# Patient Record
Sex: FEMALE | Race: AMERICAN INDIAN OR ALASKA NATIVE | Employment: FULL TIME | URBAN - METROPOLITAN AREA
[De-identification: names, ages, dates, MRNs, and addresses within clinical notes are randomized per-mention and may not be internally consistent; named-entity substitution may affect disease eponyms.]

---
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
## 2020-10-26 NOTE — TELEPHONE ENCOUNTER
DIAGNOSIS: Scoliosis / SELF    APPOINTMENT DATE: 12.1.20   NOTES STATUS DETAILS   OFFICE NOTE from referring provider N/A    OFFICE NOTE from other specialist N/A    DISCHARGE SUMMARY from hospital N/A    DISCHARGE REPORT from the ER N/A    OPERATIVE REPORT N/A    MEDICATION LIST Internal    EMG (for Spine) N/A    IMPLANT RECORD/STICKER N/A    LABS     CBC/DIFF Internal 2.1.18   CULTURES N/A    INJECTIONS DONE IN RADIOLOGY N/A    MRI N/A    CT SCAN N/A    XRAYS (IMAGES & REPORTS) N/A    TUMOR     PATHOLOGY  Slides & report N/A

## 2020-11-30 DIAGNOSIS — M41.9 SCOLIOSIS: Primary | ICD-10-CM

## 2020-12-01 ENCOUNTER — OFFICE VISIT (OUTPATIENT)
Dept: ORTHOPEDICS | Facility: CLINIC | Age: 20
End: 2020-12-01
Payer: COMMERCIAL

## 2020-12-01 ENCOUNTER — PRE VISIT (OUTPATIENT)
Dept: ORTHOPEDICS | Facility: CLINIC | Age: 20
End: 2020-12-01

## 2020-12-01 ENCOUNTER — ANCILLARY PROCEDURE (OUTPATIENT)
Dept: GENERAL RADIOLOGY | Facility: CLINIC | Age: 20
End: 2020-12-01
Attending: ORTHOPAEDIC SURGERY
Payer: COMMERCIAL

## 2020-12-01 VITALS — BODY MASS INDEX: 26.66 KG/M2 | HEIGHT: 65 IN | WEIGHT: 160 LBS

## 2020-12-01 DIAGNOSIS — M41.125 ADOLESCENT IDIOPATHIC SCOLIOSIS OF THORACOLUMBAR REGION: Primary | ICD-10-CM

## 2020-12-01 PROCEDURE — 72082 X-RAY EXAM ENTIRE SPI 2/3 VW: CPT | Performed by: STUDENT IN AN ORGANIZED HEALTH CARE EDUCATION/TRAINING PROGRAM

## 2020-12-01 PROCEDURE — 99204 OFFICE O/P NEW MOD 45 MIN: CPT | Performed by: ORTHOPAEDIC SURGERY

## 2020-12-01 ASSESSMENT — MIFFLIN-ST. JEOR: SCORE: 1497.25

## 2020-12-01 ASSESSMENT — PATIENT HEALTH QUESTIONNAIRE - PHQ9: SUM OF ALL RESPONSES TO PHQ QUESTIONS 1-9: 12

## 2020-12-01 NOTE — PROGRESS NOTES
Spine Surgery Consultation    REFERRING PHYSICIAN: Referred Self   PRIMARY CARE PHYSICIAN: Leonard Quach           Chief Complaint:   Consult (discuss scoliosis )      History of Present Illness:  Symptom Profile Including: location of symptoms, onset, severity, exacerbating/alleviating factors, previous treatments:        Korin Darby is a 20 year old female who presents today for evaluation of upper back and right arm pain that has been present for the past few months.  Patient says that she fell face first onto concrete back in March 2020 and got a concussion and had neck pain at that time which resolved.  However, in September/October she started having severe pain in right upper thoracic spine, neck, right arm.  Pain radiates down right arm in a C8 distribution and associated with numbness and tingling in the same area.  No weakness or loss of  strength in right arm.  Minimal symptoms in left arm.  No gait or balance disturbances.  Patient says that pain in right upper back/arm improved for short period, but has been worsening over the past couple weeks.  She was told by her chiropractor that she has scoliosis, but has never gotten treatment for this in the past.  She denies any low back pain or lower extremity radicular symptoms.  No other complaints or concerns today.    Past treatments tried:  - Physical therapy: none  - Injections: none  - Medications: ibuprofen    PMH:  No significant PMH  No known significant family Hx  Surgical: tonsillectomy    Social:  Student at [employer], senior, studying speech/ language therapy  Denies tobacco product use  Denies illicit drug use         Past Medical History:     Past Medical History:   Diagnosis Date     Adjustment disorder with mixed anxiety and depressed mood      Dysmenorrhea      Panic attacks             Past Surgical History:   No past surgical history on file.         Social History:     Social History     Tobacco Use     Smoking status: Never  "Smoker     Smokeless tobacco: Never Used   Substance Use Topics     Alcohol use: No            Family History:     Family History   Problem Relation Age of Onset     Anxiety Disorder Mother      Bipolar Disorder Father      Ulcerative Colitis No family hx of      Crohn's Disease No family hx of      Celiac Disease No family hx of             Allergies:     Allergies   Allergen Reactions     Amoxicillin Hives            Medications:     Current Outpatient Medications   Medication     diltiazem 2% in PLO cream, FV COMPOUNDED, 2% GEL     clindamycin-benzoyl peroxide (BENZACLIN) gel     escitalopram (LEXAPRO) 10 MG tablet     norethindrone-ethinyl estradiol (BLISOVI FE 1/20) 1-20 MG-MCG per tablet     No current facility-administered medications for this visit.              Review of Systems:     A 10 point ROS was performed and reviewed. Specific responses to these questions are noted at the end of the document.         Physical Exam:     PHYSICAL EXAM:Constitutional - Patient is healthy, well-nourished and appears stated age.    Vitals: Ht 1.66 m (5' 5.35\")   Wt 72.6 kg (160 lb)   BMI 26.34 kg/m     Respiratory - Patient is breathing normally and in no respiratory distress.   Skin - No suspicious rashes or lesions.   Psychiatric - Normal mood and affect.   Cardiovascular - Extremities warm and well perfused.   Eyes - Visual acuity is normal to the written word.   ENT - Hearing intact to the spoken word.   GI - No abdominal distention.   Musculoskeletal - Non-antalgic gait without use of assistive devices. Right ribs elevated with forward flexion. Scoliosis curvature of spine        Cervical spine:    Appearance - Normal     Palpation - Non-tender to palpation midline and paraspinals    ROM - Full , pain with right rotation    Motor -     UPPER EXTREMITY Left Right    strength 5/5 5/5   Finger ab/adduction 5/5 5/5   Wrist flexion 5/5 5/5   Wrist extension 5/5 5/5   Elbow flexion 5/5 5/5   Elbow extension 5/5 5/5 "   Shoulder flexion 5/5 5/5         Special Tests -        Lhermitte's Test - negative          Thoracic Spine:    Appearance - scoliosis    Palpation - mildly tender to palpation midline around T6 and paraspinal muscles    Strength/ROM - deferred            Lumbar Spine:    Appearance - Normal     Palpation - Non-tender to palpation midline and paraspinals    ROM - Full , painless    Motor -        LOWER EXTREMITY Left Right   Hip flexion 5/5 5/5   Knee flexion 5/5 5/5   Knee extension 5/5 5/5   Ankle dorsiflexion 5/5 5/5   Ankle plantarflexion 5/5 5/5   Great toe extension 5/5 5/5        Special tests -     Straight leg raise -Negative       Neurologic - Sensation intact to light touch bilaterally LE. Altered sensation to light touch right C8 distribution      REFLEXES Left Right   Biceps 2+ 2+   Triceps 2+ 2+   Brachioradialis 2+ 2+   Patella 2+ 2+   Ankle jerk 2+ 2+   clonus  hoffmans 1 beat  negative 1 beat  negative         Alignment:  Patient stands with a neutral standing sagittal balance.         Imaging:   We ordered and independently reviewed new radiographs at this clinic visit. The results were discussed with the patient.  Findings include:    12/1/2020 XR eos full spine standing AP/lateral views: Mild scoliosis measuring about 22 degrees right thoracic curve and 26 degrees left lumbar curve.  Grade 1 spondylolisthesis at L5-S1 with pars defect.  Loss of lordosis cervical spine.  No significant degenerative changes, fracture, or instability in cervical spine.  See full radiographic report in chart             Assessment and Plan:   Assessment:  20 year old female with right upper thoracic/shoulder pain with associated right arm radicular pain in C8 distribution.  Mild adolescent idiopathic scoliosis.  L5-S1 spondylolisthesis likely due to pars defect.     Plan:  Showed patient x-ray images today to explain her problem.  She does have mild scoliosis measuring about 20 degrees and thoracic and lumbar  spine.  This is a low degree of curvature and is unlikely to require surgical intervention for scoliosis in the future.  Explained that this is a genetic problem, so she should tell future pediatrician about her scoliosis if she is to have kids in the future.  There is also L5-S1 spondylolisthesis which is likely from a pars fracture.  Explained that many people with this, including herself, can be asymptomatic; possible to become a problem as she becomes older.  In regards to her right shoulder/arm symptoms, it is possible that this is coming from a herniated disc in the neck.  To evaluate this, we would like to get an MRI cervical spine.  In the meantime, patient should start with physical therapy.  Also offered gabapentin to treat the nerve pain symptoms in her arm, but patient would like to wait before starting this medication.  We would like patient to follow-up with a video/telephone visit after her MRI to discuss next steps in treatment.      Staff statement:  I met with the patient, performed independent history and physical exam, and overall agree with the assessment above.  Her imaging shows a low-grade scoliosis and she is skeletally mature and I think this is unlikely to progress.  She also has a pars defect at L5, but no low back pain so we are not going to work this up any further.  Lastly she is dealing with some neck and arm symptoms which seem consistent with a cervical disc herniation and I have recommended a cervical MRI.  We will follow up with her once that study is available.    Attending MD (Dr. Zaid Marinelli) :  I reviewed and verified the history and physical exam of the patient and discussed the patient's management with the other clinical providers involved in this patient's care including any involved residents or physicians assistants. I reviewed the above note and agree with the documented findings and plan of care, which were communicated to the patient.      Zaid SAN  MD Merary Marinelli PA-C    Respectfully,  Zaid Marinelli MD  Spine Surgery  [facility]

## 2020-12-01 NOTE — NURSING NOTE
"[facility]:  PHQ-9 Screening Note    SITUATION/BACKGROUND                                                    Korin Darby is a 20 year old female who completed the PHQ-9 assessment for depression and Score is >9.    Onset of symptoms: sudden and waxing and waning  Trigger: Back pain  Recent related events: Election  Prior history of suicide attempt or self harm: No   Risk Factors: anxiety  History of depression or mental illness: No   Medications reviewed: Yes     ASSESSMENT      A. Are any of the following present?      Suicidal thoughts with a plan and means to carry out the plan?    Intent to harm others    Altered mental status: confusion, delusional, psychotic YES  - Patient should be seen in the ED.  If patient is willing to go to the ED, call [employer] [facility] at [phone].  If patient is unwilling to go to the ED, call 911.   Clinic staff to fill out the  Transportation Hold  form.    Place order for referral to behavioral health team for  regular  follow-up.    NO - go to B   B. Are any of the following present?      Suicidal thoughts without a plan or means to carry out the plan    New onset of delusional ideas    Past inpatient admission for depression    New onset and recent change or addition of new medication YES  - Patient should receive crises care within 2-4 hours. Offer emergency room care or connect with any of the *crisis resources.     Place referral to behavioral health team for \"regular\" follow-up.    NO - go to C   C. Are any of the following present?      Previous suicide attempts    Depression interfering with ability to work or function    Loss of appetite and eating poorly    Abrupt cessation of drugs (OTC or RX), alcohol or caffeine    Drug or alcohol abuse YES -  [facility] behavior health team. If no response, patient should receive crisis care within 24 hours.     Place referral to behavioral health team for \"regular\" follow-up.     NO - " "go to D   D. Are several of the following present?      Difficulty concentrating    Difficulty sleeping    Reduced interest in sexual activity or impotency    Irregular or absent menstruation    No interest in activity    Change in interpersonal relationships    Increased use/abuse of alcohol or drugs    Pregnant or recent child birth    Recent major life change    History of depression YES -  Follow-up with PCP for appointment and follow home care instructions.    Place referral to behavioral health team for \"regular\" follow-up.    NO - provide home care instructions.        PLAN      Home Care Instructions:   If currently in counseling, call counselor for appointment  Call local crisis interventions  Contact friends or family for support    Report the following to your PCP:   Suicidal thoughts without a plan or means to carry out the plan  Depression interferes with daily activities  Persistent inability to sleep    Seek emergency care immediately if any of the following occur:   Suicidal thoughts and plan and means to carry out the plan  Injury to self or others  Altered mental status:  Confusion, Delusional, Pyschotic    BEHAVIORAL HEALTH TEAMS      [facility] - Behavioral Health Team    [facility] Pager: [phone]    [place]  - Behavioral Health Team    Pager number: [phone]    Referral to Behavioral Health    BEHAVIORAL / SPIRITUAL HEALTH [facility] [[id number]}    RESOURCES      - 24/7 Crisis Hotlines: National Suicide Prevention Hotline  343-764-GDPG (9119)  - Non Emergent Transportation:  [place] ([facility]): [phone]  - [facility] Pagers:  [place]: pager #: [phone]    Marlene Matamoros RN        Copyright 2016 [employer]      "

## 2020-12-01 NOTE — NURSING NOTE
"Reason For Visit:   Chief Complaint   Patient presents with     Consult     discuss scoliosis        Primary MD: Leonard Quach      Type of injury: Scoliosis.  Type of surgery: n/a.  Smoker: No  Request smoking cessation information: No    Ht 1.66 m (5' 5.35\")   Wt 72.6 kg (160 lb)   BMI 26.34 kg/m      Pain Assessment  Patient Currently in Pain: Yes  0-10 Pain Scale: 4  Primary Pain Location: (around right scapular area)                        Consuelo Thompson ATC    "

## 2020-12-01 NOTE — LETTER
12/1/2020         RE: Korin Darby  [address]  [facility] [address]        Dear Colleague,    Thank you for referring your patient, Korin Darby, to the [facility] [place]. Please see a copy of my visit note below.    Spine Surgery Consultation    REFERRING PHYSICIAN: Referred Self   PRIMARY CARE PHYSICIAN: Leonard Quach           Chief Complaint:   Consult (discuss scoliosis )      History of Present Illness:  Symptom Profile Including: location of symptoms, onset, severity, exacerbating/alleviating factors, previous treatments:        Korin Darby is a 20 year old female who presents today for evaluation of upper back and right arm pain that has been present for the past few months.  Patient says that she fell face first onto concrete back in March 2020 and got a concussion and had neck pain at that time which resolved.  However, in September/October she started having severe pain in right upper thoracic spine, neck, right arm.  Pain radiates down right arm in a C8 distribution and associated with numbness and tingling in the same area.  No weakness or loss of  strength in right arm.  Minimal symptoms in left arm.  No gait or balance disturbances.  Patient says that pain in right upper back/arm improved for short period, but has been worsening over the past couple weeks.  She was told by her chiropractor that she has scoliosis, but has never gotten treatment for this in the past.  She denies any low back pain or lower extremity radicular symptoms.  No other complaints or concerns today.    Past treatments tried:  - Physical therapy: none  - Injections: none  - Medications: ibuprofen    PMH:  No significant PMH  No known significant family Hx  Surgical: tonsillectomy    Social:  Student at [employer], senior, studying speech/ language therapy  Denies tobacco product use  Denies illicit drug use         Past Medical History:     Past Medical  "History:   Diagnosis Date     Adjustment disorder with mixed anxiety and depressed mood      Dysmenorrhea      Panic attacks             Past Surgical History:   No past surgical history on file.         Social History:     Social History     Tobacco Use     Smoking status: Never Smoker     Smokeless tobacco: Never Used   Substance Use Topics     Alcohol use: No            Family History:     Family History   Problem Relation Age of Onset     Anxiety Disorder Mother      Bipolar Disorder Father      Ulcerative Colitis No family hx of      Crohn's Disease No family hx of      Celiac Disease No family hx of             Allergies:     Allergies   Allergen Reactions     Amoxicillin Hives            Medications:     Current Outpatient Medications   Medication     diltiazem 2% in PLO cream, FV COMPOUNDED, 2% GEL     clindamycin-benzoyl peroxide (BENZACLIN) gel     escitalopram (LEXAPRO) 10 MG tablet     norethindrone-ethinyl estradiol (BLISOVI FE 1/20) 1-20 MG-MCG per tablet     No current facility-administered medications for this visit.              Review of Systems:     A 10 point ROS was performed and reviewed. Specific responses to these questions are noted at the end of the document.         Physical Exam:     PHYSICAL EXAM:Constitutional - Patient is healthy, well-nourished and appears stated age.    Vitals: Ht 1.66 m (5' 5.35\")   Wt 72.6 kg (160 lb)   BMI 26.34 kg/m     Respiratory - Patient is breathing normally and in no respiratory distress.   Skin - No suspicious rashes or lesions.   Psychiatric - Normal mood and affect.   Cardiovascular - Extremities warm and well perfused.   Eyes - Visual acuity is normal to the written word.   ENT - Hearing intact to the spoken word.   GI - No abdominal distention.   Musculoskeletal - Non-antalgic gait without use of assistive devices. Right ribs elevated with forward flexion. Scoliosis curvature of spine        Cervical spine:    Appearance - Normal     Palpation - " Non-tender to palpation midline and paraspinals    ROM - Full , pain with right rotation    Motor -     UPPER EXTREMITY Left Right    strength 5/5 5/5   Finger ab/adduction 5/5 5/5   Wrist flexion 5/5 5/5   Wrist extension 5/5 5/5   Elbow flexion 5/5 5/5   Elbow extension 5/5 5/5   Shoulder flexion 5/5 5/5         Special Tests -        Lhermitte's Test - negative          Thoracic Spine:    Appearance - scoliosis    Palpation - mildly tender to palpation midline around T6 and paraspinal muscles    Strength/ROM - deferred            Lumbar Spine:    Appearance - Normal     Palpation - Non-tender to palpation midline and paraspinals    ROM - Full , painless    Motor -        LOWER EXTREMITY Left Right   Hip flexion 5/5 5/5   Knee flexion 5/5 5/5   Knee extension 5/5 5/5   Ankle dorsiflexion 5/5 5/5   Ankle plantarflexion 5/5 5/5   Great toe extension 5/5 5/5        Special tests -     Straight leg raise -Negative       Neurologic - Sensation intact to light touch bilaterally LE. Altered sensation to light touch right C8 distribution      REFLEXES Left Right   Biceps 2+ 2+   Triceps 2+ 2+   Brachioradialis 2+ 2+   Patella 2+ 2+   Ankle jerk 2+ 2+   clonus  hoffmans 1 beat  negative 1 beat  negative         Alignment:  Patient stands with a neutral standing sagittal balance.         Imaging:   We ordered and independently reviewed new radiographs at this clinic visit. The results were discussed with the patient.  Findings include:    12/1/2020 XR eos full spine standing AP/lateral views: Mild scoliosis measuring about 22 degrees right thoracic curve and 26 degrees left lumbar curve.  Grade 1 spondylolisthesis at L5-S1 with pars defect.  Loss of lordosis cervical spine.  No significant degenerative changes, fracture, or instability in cervical spine.  See full radiographic report in chart             Assessment and Plan:   Assessment:  20 year old female with right upper thoracic/shoulder pain with associated right  arm radicular pain in C8 distribution.  Mild adolescent idiopathic scoliosis.  L5-S1 spondylolisthesis likely due to pars defect.     Plan:  Showed patient x-ray images today to explain her problem.  She does have mild scoliosis measuring about 20 degrees and thoracic and lumbar spine.  This is a low degree of curvature and is unlikely to require surgical intervention for scoliosis in the future.  Explained that this is a genetic problem, so she should tell future pediatrician about her scoliosis if she is to have kids in the future.  There is also L5-S1 spondylolisthesis which is likely from a pars fracture.  Explained that many people with this, including herself, can be asymptomatic; possible to become a problem as she becomes older.  In regards to her right shoulder/arm symptoms, it is possible that this is coming from a herniated disc in the neck.  To evaluate this, we would like to get an MRI cervical spine.  In the meantime, patient should start with physical therapy.  Also offered gabapentin to treat the nerve pain symptoms in her arm, but patient would like to wait before starting this medication.  We would like patient to follow-up with a video/telephone visit after her MRI to discuss next steps in treatment.      Staff statement:  I met with the patient, performed independent history and physical exam, and overall agree with the assessment above.  Her imaging shows a low-grade scoliosis and she is skeletally mature and I think this is unlikely to progress.  She also has a pars defect at L5, but no low back pain so we are not going to work this up any further.  Lastly she is dealing with some neck and arm symptoms which seem consistent with a cervical disc herniation and I have recommended a cervical MRI.  We will follow up with her once that study is available.    Attending MD (Dr. Zaid Marinelli) :  I reviewed and verified the history and physical exam of the patient and discussed the patient's  management with the other clinical providers involved in this patient's care including any involved residents or physicians assistants. I reviewed the above note and agree with the documented findings and plan of care, which were communicated to the patient.      MD Merary Hernandez PA-C    Respectfully,  Zaid Marinelli MD  Spine Surgery  [facility]

## 2020-12-10 ENCOUNTER — ANCILLARY PROCEDURE (OUTPATIENT)
Dept: MRI IMAGING | Facility: CLINIC | Age: 20
End: 2020-12-10
Attending: ORTHOPAEDIC SURGERY
Payer: COMMERCIAL

## 2020-12-10 DIAGNOSIS — M41.125 ADOLESCENT IDIOPATHIC SCOLIOSIS OF THORACOLUMBAR REGION: ICD-10-CM

## 2020-12-10 PROCEDURE — 72141 MRI NECK SPINE W/O DYE: CPT | Mod: GC | Performed by: RADIOLOGY

## 2020-12-15 ENCOUNTER — VIRTUAL VISIT (OUTPATIENT)
Dept: ORTHOPEDICS | Facility: CLINIC | Age: 20
End: 2020-12-15
Payer: COMMERCIAL

## 2020-12-15 DIAGNOSIS — M41.125 ADOLESCENT IDIOPATHIC SCOLIOSIS OF THORACOLUMBAR REGION: Primary | ICD-10-CM

## 2020-12-15 DIAGNOSIS — M25.511 RIGHT SHOULDER PAIN: ICD-10-CM

## 2020-12-15 PROCEDURE — 99213 OFFICE O/P EST LOW 20 MIN: CPT | Mod: 95 | Performed by: ORTHOPAEDIC SURGERY

## 2020-12-15 NOTE — NURSING NOTE
Reason For Visit:   Chief Complaint   Patient presents with     RECHECK     follow up on cervical MRI        There were no vitals taken for this visit.         Marquise Vital ATC

## 2020-12-15 NOTE — PROGRESS NOTES
"Korin Darby is a 20 year old female who is being evaluated via a billable video visit.      The patient has been notified of following:     \"This video visit will be conducted via a call between you and your physician/provider. We have found that certain health care needs can be provided without the need for an in-person physical exam.  This service lets us provide the care you need with a video conversation.  If a prescription is necessary we can send it directly to your pharmacy.  If lab work is needed we can place an order for that and you can then stop by our lab to have the test done at a later time.    Video visits are billed at different rates depending on your insurance coverage.  Please reach out to your insurance provider with any questions.    If during the course of the call the physician/provider feels a video visit is not appropriate, you will not be charged for this service.\"    Patient has given verbal consent for Video visit? Yes  How would you like to obtain your AVS? MyChart  If you are dropped from the video visit, the video invite should be resent to: Text to cell phone: [phone]  Will anyone else be joining your video visit? No      I spoke with Korin again today.  She has been dealing with pain in the right trapezium shoulder and periscapular area that comes down the back of the right arm and into her triceps region.  Sometimes there is tingling down in the hand.  We sent her for a cervical MRI to investigate this.    The good news is that we have the cervical MRI today and there is no evidence of any cord or nerve root compression.  The MRI is basically normal.    It may be that with her scoliosis and upper thoracic imbalance that this is causing some periscapular pain.  This can create muscle imbalance, and I think this would be best addressed with PT at this time.      I explained that this means there is no indication for spine surgery or injections at this time.  I have " given her a referral for right periscapular and rotator cuff strengthening.  She may be dealing with some periscapular dysfunction or trapezial dysfunction I am hopeful therapy can help her work on this.  We also discussed muscle relaxants or gabapentin, but given that she is working and in school I am concerned that these would make her sedated and interfere with her normal activities so we agreed not to do any prescription medications at this time.    Given that the spine MRI of her neck is normal I do not think there is any indication for surgery or follow-up with a surgical office at this time.  If she does not improve I asked her to contact us and we would make a referral to one of our nonoperative physicians at that time.  Video-Visit Details    Type of service:  Video Visit    Video Time 13 minutes    Originating Location (pt. Location): Home    Distant Location (provider location):  [facility]     Platform used for Video Visit: Luis Marinelli MD

## 2020-12-15 NOTE — LETTER
"    12/15/2020         RE: Korin Darby  [address]  [facility] [address]        Dear Colleague,    Thank you for referring your patient, Korin Darby, to the [facility] [place]. Please see a copy of my visit note below.    Korin Darby is a 20 year old female who is being evaluated via a billable video visit.      The patient has been notified of following:     \"This video visit will be conducted via a call between you and your physician/provider. We have found that certain health care needs can be provided without the need for an in-person physical exam.  This service lets us provide the care you need with a video conversation.  If a prescription is necessary we can send it directly to your pharmacy.  If lab work is needed we can place an order for that and you can then stop by our lab to have the test done at a later time.    Video visits are billed at different rates depending on your insurance coverage.  Please reach out to your insurance provider with any questions.    If during the course of the call the physician/provider feels a video visit is not appropriate, you will not be charged for this service.\"    Patient has given verbal consent for Video visit? Yes  How would you like to obtain your AVS? MyChart  If you are dropped from the video visit, the video invite should be resent to: Text to cell phone: [phone]  Will anyone else be joining your video visit? No      I spoke with Korin again today.  She has been dealing with pain in the right trapezium shoulder and periscapular area that comes down the back of the right arm and into her triceps region.  Sometimes there is tingling down in the hand.  We sent her for a cervical MRI to investigate this.    The good news is that we have the cervical MRI today and there is no evidence of any cord or nerve root compression.  The MRI is basically normal.    It may be that with her scoliosis and " upper thoracic imbalance that this is causing some periscapular pain.  This can create muscle imbalance, and I think this would be best addressed with PT at this time.      I explained that this means there is no indication for spine surgery or injections at this time.  I have given her a referral for right periscapular and rotator cuff strengthening.  She may be dealing with some periscapular dysfunction or trapezial dysfunction I am hopeful therapy can help her work on this.  We also discussed muscle relaxants or gabapentin, but given that she is working and in school I am concerned that these would make her sedated and interfere with her normal activities so we agreed not to do any prescription medications at this time.    Given that the spine MRI of her neck is normal I do not think there is any indication for surgery or follow-up with a surgical office at this time.  If she does not improve I asked her to contact us and we would make a referral to one of our nonoperative physicians at that time.  Video-Visit Details    Type of service:  Video Visit    Video Time 13 minutes    Originating Location (pt. Location): Home    Distant Location (provider location):  [facility]     Platform used for Video Visit: Luis Marinelli MD

## 2020-12-17 ENCOUNTER — THERAPY VISIT (OUTPATIENT)
Dept: PHYSICAL THERAPY | Facility: CLINIC | Age: 20
End: 2020-12-17
Attending: ORTHOPAEDIC SURGERY
Payer: COMMERCIAL

## 2020-12-17 DIAGNOSIS — M25.511 RIGHT SHOULDER PAIN: ICD-10-CM

## 2020-12-17 DIAGNOSIS — M41.125 ADOLESCENT IDIOPATHIC SCOLIOSIS OF THORACOLUMBAR REGION: ICD-10-CM

## 2020-12-17 PROCEDURE — 97110 THERAPEUTIC EXERCISES: CPT | Mod: GP | Performed by: PHYSICAL THERAPIST

## 2020-12-17 PROCEDURE — 97161 PT EVAL LOW COMPLEX 20 MIN: CPT | Mod: GP | Performed by: PHYSICAL THERAPIST

## 2020-12-17 NOTE — PROGRESS NOTES
Physical Therapy Initial Evaluation  December 17, 2020     MD Instructions/Precautions/Restrictions: PT eval and treat. Periscap and RC strengthening    Subjective:   C/C: R posterior shoulder/upper scapular pain, posterior upper arm pain (RHD). After her fall, had bad neck pain for a few days, but then that resolved. Then a few months later, working on computer/Zoom meetings for school, and started having more severe pains in her R shoulder blade and going into posterior R arm. Will occasionally get pinky numbness as well. Denies weakness in her hand. No prior injuries to neck/shoulder. Reports her pain is 55% posterior shoulder, neck 35%, 10% arm.   Date of Onset: March 2020  Worsened by: Repetitive use of arm, prolonged use of computer (>45min).    Alleviated by: Getting up, walking around.      General health as reported by patient: excellent  Pertinent medical/surgical history: Refer to health history in EMR.   Imaging: MRI.   Prior treatment? 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   Typical Physical Activities: 3x 3-5 mile runs per week (hasn't been doing recently).   Patient's goals are: decrease pain.   Return to MD:  PRN.       Objective:  SHOULDER EXAMINATION    STATIC POSTURE  Forward head on neck and Rounded shoulders (both moderate)  Cervical screen:  Flexion: full, painfree  Extension: upper cervical strategy/dominant, min loss, increased R lower cspine pain  R rotation: 25% loss, R lower cspine pain; L full and painfree  L sidebend 25% loss w/R lower cspine pain; R full and painfree  Protraction: no effect  Retraction: equivocal, maybe slightly less R lower cspine pain      Scapular Kinematic Evaluation:   Position/Test Findings   Hands resting at sides Downward rotation bilaterally   Hands on hips Downward rotation bilaterally, very mild inferior angle prominence R>L   90deg scaption No obvious findings   Repeated elevation       Plane of ABD       Plane of Flexion   No  obvious findings  No obvious findings     SHOULDER RANGE OF MOTION & STRENGTH  (*Indicates patient s pain)  Full painfree all directions      Myotomes Strength (MMT)    L R   Resisted ABD (C5) 5 5   Resisted Elbow Flexion (C6)                 Wrist Extension 5 5  5   Resisted Elbow Extension (C7)                 Wrist Flexion 5  5 5  5   Resisted Thumb Extension (C8) 5 5   Resisted Intrinsics (T1) 5 5     Sensory/Reflex Tests: Diminished at C4, C5, T1 on R.           Assessment/Plan:    The patient is a 20 year old female with chief complaint of R shoulder/arm pain.    The patient has the following significant findings with corresponding treatment plan.  Diagnosis 1:  Neck/R shoulder and posterior arm pain    Pain -  hot/cold therapy, US, electric stimulation, mechanical traction, manual therapy, splint/taping/bracing/orthotics, self management, education, directional preference exercise and home program  Decreased ROM/flexibility - manual therapy, therapeutic exercise and home program  Decreased joint mobility - manual therapy, therapeutic exercise and home program  Decreased strength - therapeutic exercise, therapeutic activities and home program  Impaired muscle performance - neuro re-education and home program  Decreased function - therapeutic activities and home program  Impaired posture - neuro re-education, therapeutic activities and home program    Therapy Evaluation Codes:   1) History comprised of:   Personal factors that impact the plan of care:      Please refer to health history in EMR.    Comorbidity factors that impact the plan of care are:      Please refer to health history in EMR.     Medications impacting care: None.  2) Examination of Body Systems comprised of:   Body structures and functions that impact the plan of care:      Cervical spine and Shoulder.   Activity limitations that impact the plan of care are:      Reading/Computer work and Sitting.   Clinical presentation characteristics  are:    Stable/Uncomplicated.  3) Presentation comprised of:   Presentation scored as Low complexity with uncomplicated characteristics..  4) Decision-Making    Low complexity using standardized patient assessment instrument and/or measureable assessment of functional outcome.  Cumulative Therapy Evaluation is: Low complexity.    Previous and current functional limitations:  (See Goal Flow Sheet for this information)    Short term and Long term goals: (See Goal Flow Sheet for this information)     Communication ability:  Patient appears to be able to clearly communicate and understand verbal and written communication and follow directions correctly.  Treatment Explanation - The following has been discussed with the patient: RX ordered/plan of care, anticipated outcomes, and possible risks and side effects.  This patient would benefit from PT intervention to resume normal activities.   Rehab potential is excellent.    Frequency:  1 X week, once daily  Duration:  for 4 weeks tapering to 2x/month for 1 months  Discharge Plan: Achieve all LTGs, be independent in home treatment program, and reach maximal therapeutic benefit.    Please refer to the daily flowsheet for treatment today, total treatment time and time spent performing 1:1 timed codes.

## 2020-12-20 ENCOUNTER — HEALTH MAINTENANCE LETTER (OUTPATIENT)
Age: 20
End: 2020-12-20

## 2021-01-01 ENCOUNTER — TRANSFERRED RECORDS (OUTPATIENT)
Dept: HEALTH INFORMATION MANAGEMENT | Facility: CLINIC | Age: 21
End: 2021-01-01

## 2021-01-01 LAB
CHLAMYDIA - HIM PATIENT REPORTED: NORMAL
PAP SMEAR - HIM PATIENT REPORTED: NEGATIVE

## 2021-01-15 ENCOUNTER — THERAPY VISIT (OUTPATIENT)
Dept: PHYSICAL THERAPY | Facility: CLINIC | Age: 21
End: 2021-01-15
Payer: COMMERCIAL

## 2021-01-15 DIAGNOSIS — M41.125 ADOLESCENT IDIOPATHIC SCOLIOSIS OF THORACOLUMBAR REGION: ICD-10-CM

## 2021-01-15 DIAGNOSIS — M25.511 RIGHT SHOULDER PAIN: ICD-10-CM

## 2021-01-15 PROCEDURE — 97140 MANUAL THERAPY 1/> REGIONS: CPT | Mod: GP | Performed by: PHYSICAL THERAPIST

## 2021-01-15 PROCEDURE — 97110 THERAPEUTIC EXERCISES: CPT | Mod: GP | Performed by: PHYSICAL THERAPIST

## 2021-01-15 PROCEDURE — 97112 NEUROMUSCULAR REEDUCATION: CPT | Mod: GP | Performed by: PHYSICAL THERAPIST

## 2021-03-20 ENCOUNTER — NURSE TRIAGE (OUTPATIENT)
Dept: NURSING | Facility: CLINIC | Age: 21
End: 2021-03-20

## 2021-03-20 NOTE — TELEPHONE ENCOUNTER
Korin is having UTI symptoms frequent urination and urgency and pressure in lower abdomen.  Denies blood in urine.  Denies fever cough and shortness of breath.  Korin states that she will go to urgent care.    COVID 19 Nurse Triage Plan/Patient Instructions    Please be aware that novel coronavirus (COVID-19) may be circulating in the community. If you develop symptoms such as fever, cough, or SOB or if you have concerns about the presence of another infection including coronavirus (COVID-19), please contact your health care provider or visit [email or web address].     Disposition/Instructions    In-Person Visit with provider recommended. Reference Visit Selection Guide.    Thank you for taking steps to prevent the spread of this virus.  o Limit your contact with others.  o Wear a simple mask to cover your cough.  o Wash your hands well and often.    Resources    [facility]: About COVID-19: [email or web address]    CDC: What to Do If You're Sick: www.cdc.gov/coronavirus/2019-ncov/about/steps-when-sick.html    CDC: Ending Home Isolation: www.cdc.gov/coronavirus/2019-ncov/hcp/disposition-in-home-patients.html     CDC: Caring for Someone: www.cdc.gov/coronavirus/2019-ncov/if-you-are-sick/care-for-someone.html     [facility]: Interim Guidance for Hospital Discharge to Home: www.health.[facility].mn.us/diseases/coronavirus/hcp/hospdischarge.pdf    [facility] clinical trials (COVID-19 research studies): [email or web address]     Below are the COVID-19 hotlines at the [place] Department of Health ([facility]). Interpreters are available.   o For health questions: Call 776-861-1120 or 1-402.444.3885 (7 a.m. to 7 p.m.)  o For questions about schools and childcare: Call 827-571-0321 or 1-789.439.4004 (7 a.m. to 7 p.m.)                       Additional Information    Negative: Shock suspected (e.g., cold/pale/clammy skin, too weak to stand, low BP, rapid pulse)    Negative:  Sounds like a life-threatening emergency to the triager    Negative: [1] Unable to urinate (or only a few drops) > 4 hours AND     [2] bladder feels very full (e.g., palpable bladder or strong urge to urinate)    Negative: [1] Decreased urination and [2] drinking very little AND [2] dehydration suspected (e.g., dark urine, no urine > 12 hours, very dry mouth, very lightheaded)    Negative: Patient sounds very sick or weak to the triager    Negative: Fever > 100.5 F (38.1 C)    Negative: Side (flank) or lower back pain present    Negative: [1] Can't control passage of urine (i.e., urinary incontinence) AND [2] new onset (< 2 weeks) or worsening    Urinating more frequently than usual (i.e., frequency)    Protocols used: URINARY SYMPTOMS-A-AH

## 2021-04-26 ENCOUNTER — VIRTUAL VISIT (OUTPATIENT)
Dept: DERMATOLOGY | Facility: CLINIC | Age: 21
End: 2021-04-26
Payer: COMMERCIAL

## 2021-04-26 DIAGNOSIS — L70.0 ACNE VULGARIS: Primary | ICD-10-CM

## 2021-04-26 PROCEDURE — 99204 OFFICE O/P NEW MOD 45 MIN: CPT | Mod: 95 | Performed by: PHYSICIAN ASSISTANT

## 2021-04-26 RX ORDER — TRETINOIN 0.5 MG/G
CREAM TOPICAL AT BEDTIME
Qty: 45 G | Refills: 3 | Status: SHIPPED | OUTPATIENT
Start: 2021-04-26

## 2021-04-26 RX ORDER — TRETINOIN 0.25 MG/G
CREAM TOPICAL
COMMUNITY
Start: 2015-01-21 | End: 2021-09-27 | Stop reason: DRUGHIGH

## 2021-04-26 RX ORDER — SPIRONOLACTONE 50 MG/1
50 TABLET, FILM COATED ORAL 2 TIMES DAILY
Qty: 60 TABLET | Refills: 2 | Status: SHIPPED | OUTPATIENT
Start: 2021-04-26 | End: 2021-09-27

## 2021-04-26 ASSESSMENT — PAIN SCALES - GENERAL: PAINLEVEL: MILD PAIN (3)

## 2021-04-26 NOTE — NURSING NOTE
Dermatology Rooming Note    Korin Darby's goals for this visit include:   Chief Complaint   Patient presents with     Derm Problem     korin zhong having this visit to discuss acne, states that it is located on the cheeks and forehead     Maryann Astorga CMA on 4/26/2021 at 10:17 AM

## 2021-04-26 NOTE — PATIENT INSTRUCTIONS
-Start spironolactone 50 mg BID. For the first week take 50 mg daily and if no side-effects increase to twice daiy dosing.   -Counseled on side effects of spironolactone including lightheadedness, urinary frequency, spotting between periods and breast tenderness.   -Counseled that spironolactone may take 3-4 months to take clinical effect.    Will check blood work annually    -Will check blood pressure in 3 months   -Counseled that spironolactone can cause feminization to a male fetus and should avoid pregnancy.     Topical Retinoids    What are topical retinoids?    These are medicines that are related to Vitamin A. They are used on the skin.    Retin-A , Renova , Differin , and Tazorac  are brand names.    Come in creams and clear gels    Used to treat skin conditions like pimples (acne), face wrinkling, or dark-colored sunspots    How do I use these medicines?    Wash face and let dry for 15 to 30 minutes.    Use a large pea-size amount of medicine to cover the whole face. Do not put on close to the eyes and lips.  Rub in gently.     Start by using every other day.  If you have no irritation after a few days, start to use it daily.      You might have too much irritation with daily use. Use it less often until the irritation goes away.  Then try to increase slowly to daily use.     Irritation improves over time.    You may use moisturizer if your skin becomes dry. Look for  non-comedogenic  (non-pore plugging) and oil free products.      What are the side effects?    Dryness     Peeling and flaking     Irritation of the skin     Possible increased chance of sunburns.  Protect your skin from sunlight. Wear a hat and use a sunscreen with SPF 30 or higher. Your sunscreen should have both UVA and UVB (broad-spectrum) protection.    Pediatric Dermatology  [place] [id number] [place] [id number] [phone]    SUN PROTECTION      Aveeno Active Natural Protection Mineral Block  Lotion SPF 30  Aveeno Baby Natural Protection Face Stick SPF 50+  Banana Boat Natural Reflect (baby or kids) SPF 50+  Bare Republic SPR 50 Stick   Beauty Countersun Mineral Sunscreen Stick SPF 30  [place] s Bees Chemical-Free Sunscreen SPF 30  Blue Lizard Baby SPF 30+  Blue Lizard for Sensitive Skin SPF 30+  Cotz Pediatric Pure SPF 30  Cotz Pediatric Face SPF 40  Cotz 20% Zinc SPF 35  CVS Sensitive Skin 30  CVS Baby Lotion Sunscreen SPF 60+  EltaMD UV Physical Broad-Spectrum SPF 41  La Roche-Posay Anthelios Mineral Zinc Oxide Sunscreen SPF 50  Mustella Broad Spectrum SPF 50+/Mineral Sunscreen Stick  Neutrogena Sensitive Skin- Pure and Free Baby SPF 30  Neutrogena Sensitive Skin-Pure and Free Baby  SPF 50+  Neutrogena Sheer Zinc Oxide Dry-Touch Face Sunscreen with Broad Spectrum SPF 50, Oil-Free, Non-Comedogenic & Non-Greasy Mineral Sunscreen  Thinkbaby Safe Sunscreen SPF 50+,   [place]inksport Sunscreen SPF 50+,   PreSun Sensitive Sunblock SPF 28  Vanicream Sunscreen for Sensitive Skin SPF 30 or 50  [facility] s Sensitive Skin SPF 70

## 2021-04-26 NOTE — LETTER
4/26/2021       RE: Korin Darby  [address]  [facility] [address]     Dear Colleague,    Thank you for referring your patient, Korin Darby, to the [facility]MAT[facility] [place] at [facility]. Please see a copy of my visit note below.    [facility] Dermatology Note  Encounter Date: Apr 26, 2021  Store-and-Forward and Telephone ([phone]). Location of teledermatologist: [facility] [place].  Start time: 10:31. End time: 10:47    Dermatology Problem List:  1. Acne vulgaris-  Spironolactone 50 mg twice daily  Tretinoin 0.05% cream at bedtime    ____________________________________________    Assessment & Plan:     #Acne vulgaris, inflammatory and comedonal with hormonal flares.  Patient prefers to be off of hormonal therapy such as oral birth control due to mood changes.  -Start spironolactone 50 mg BID. For the first week take 50 mg daily and if no side-effects increase to twice daiy dosing.   -Counseled on side effects of spironolactone including lightheadedness, urinary frequency, spotting between periods and breast tenderness.   -Counseled that spironolactone may take 3-4 months to take clinical effect.    Will check blood work annually    -Will check blood pressure in 3 months   -Counseled that spironolactone can cause feminization to a male fetus and should avoid pregnancy.     Start tretinoin 0.05% cream to face. Instructed to apply topical acne medication once every other day and increase to nightly as tolerate.  Waiting 20-30 minutes after washing affected area(s) will decrease irritation. Method of application, side effects and expected results were discussed. The patient will apply pea size amount to the entire face, avoid areas around the eyes, corners of nose and mouth. Discussed side effects including photosensitivity and irritation. Discussed medication is  pregnancy category C and patient should stop medication and contact clinic if she becomes pregnant. Appropriate handout provided.      Procedures Performed:    None    Follow-up: 3 month(s) in-person, or earlier for new or changing lesions    Staff:     All risks, benefits and alternatives were discussed with patient.  Patient is in agreement and understands the assessment and plan.  All questions were answered.  Sun Screen Education was given.   Return to Clinic in 3 months or sooner as needed.   Blanca Oconnell PA-C   ____________________________________________    CC: Derm Problem (korin us having this visit to discuss acne, states that it is located on the cheeks and forehead)    HPI:  Ms. Korin Darby is a(n) 21 year old female who presents today as a new patient for acne.  She states she has had acne for a few years.  She had been on birth control to help with cramping and for birth control itself.  She found she had worsening depression while on this and stopped her birth control 2019.  She is currently using condoms for birth control.  Her acne really flared after she went off the birth control.  Had taken her several months to get a normal cycle but finally at the end of last year had regular menses.  She has acne throughout the month worse during her menses.  She had been prescribed topical medications including tretinoin 0.025% cream at bedtime and BenzaClin gel in the morning.  She has been using this for the last year.    She ran out of BenzaClin December 2020.  She started using CeraVe 4% benzyl peroxide wash twice daily to the face and substitution.  She continues to use tretinoin 0.025% cream at bedtime.  Her acne is somewhat worse.  She is looking for other treatment options.  She is tolerating the tretinoin cream well at this time.  She uses moisturizers.    There is a family history of cystic acne in her family including her mother, brother.  Father also had acne. Accutane has been  used for some family members.  Patient is otherwise feeling well, without additional skin concerns.    Labs Reviewed:  NA    Physical Exam:  Vitals: There were no vitals taken for this visit.  SKIN: Teledermatology photos were reviewed; image quality and interpretability: Acceptable. Image date: 4/25/2021.  -There are pink papules scattered to the cheeks chin area and mandibles.  There are a few superficial papules on the forehead shoulders and upper back.  Scattered closed comedones on the forehead.  - No other lesions of concern on areas examined.     Medications:  Current Outpatient Medications   Medication     tretinoin (RETIN-A) 0.025 % external cream     clindamycin-benzoyl peroxide (BENZACLIN) gel     diltiazem 2% in PLO cream, FV COMPOUNDED, 2% GEL     escitalopram (LEXAPRO) 10 MG tablet     norethindrone-ethinyl estradiol (BLISOVI FE 1/20) 1-20 MG-MCG per tablet     No current facility-administered medications for this visit.       Past Medical/Surgical History:   Patient Active Problem List   Diagnosis     Adolescent idiopathic scoliosis of thoracolumbar region     Right shoulder pain     Past Medical History:   Diagnosis Date     Adjustment disorder with mixed anxiety and depressed mood      Dysmenorrhea      Panic attacks        CC Referred Self, MD  No address on file on close of this encounter.

## 2021-04-26 NOTE — PROGRESS NOTES
[facility] Dermatology Note  Encounter Date: Apr 26, 2021  Store-and-Forward and Telephone ([phone]). Location of teledermatologist: [facility] [place].  Start time: 10:31. End time: 10:47    Dermatology Problem List:  1. Acne vulgaris-  Spironolactone 50 mg twice daily  Tretinoin 0.05% cream at bedtime    ____________________________________________    Assessment & Plan:     #Acne vulgaris, inflammatory and comedonal with hormonal flares.  Patient prefers to be off of hormonal therapy such as oral birth control due to mood changes.  -Start spironolactone 50 mg BID. For the first week take 50 mg daily and if no side-effects increase to twice daiy dosing.   -Counseled on side effects of spironolactone including lightheadedness, urinary frequency, spotting between periods and breast tenderness.   -Counseled that spironolactone may take 3-4 months to take clinical effect.    Will check blood work annually    -Will check blood pressure in 3 months   -Counseled that spironolactone can cause feminization to a male fetus and should avoid pregnancy.     Start tretinoin 0.05% cream to face. Instructed to apply topical acne medication once every other day and increase to nightly as tolerate.  Waiting 20-30 minutes after washing affected area(s) will decrease irritation. Method of application, side effects and expected results were discussed. The patient will apply pea size amount to the entire face, avoid areas around the eyes, corners of nose and mouth. Discussed side effects including photosensitivity and irritation. Discussed medication is pregnancy category C and patient should stop medication and contact clinic if she becomes pregnant. Appropriate handout provided.      Procedures Performed:    None    Follow-up: 3 month(s) in-person, or earlier for new or changing lesions    Staff:     All risks, benefits and alternatives were discussed with patient.  Patient is in  agreement and understands the assessment and plan.  All questions were answered.  Sun Screen Education was given.   Return to Clinic in 3 months or sooner as needed.   Blanca Oconnell PA-C   ____________________________________________    CC: Derm Problem (korin us having this visit to discuss acne, states that it is located on the cheeks and forehead)    HPI:  Ms. Korin Darby is a(n) 21 year old female who presents today as a new patient for acne.  She states she has had acne for a few years.  She had been on birth control to help with cramping and for birth control itself.  She found she had worsening depression while on this and stopped her birth control 2019.  She is currently using condoms for birth control.  Her acne really flared after she went off the birth control.  Had taken her several months to get a normal cycle but finally at the end of last year had regular menses.  She has acne throughout the month worse during her menses.  She had been prescribed topical medications including tretinoin 0.025% cream at bedtime and BenzaClin gel in the morning.  She has been using this for the last year.    She ran out of BenzaClin December 2020.  She started using CeraVe 4% benzyl peroxide wash twice daily to the face and substitution.  She continues to use tretinoin 0.025% cream at bedtime.  Her acne is somewhat worse.  She is looking for other treatment options.  She is tolerating the tretinoin cream well at this time.  She uses moisturizers.    There is a family history of cystic acne in her family including her mother, brother.  Father also had acne. Accutane has been used for some family members.  Patient is otherwise feeling well, without additional skin concerns.    Labs Reviewed:  NA    Physical Exam:  Vitals: There were no vitals taken for this visit.  SKIN: Teledermatology photos were reviewed; image quality and interpretability: Acceptable. Image date: 4/25/2021.  -There are pink papules  scattered to the cheeks chin area and mandibles.  There are a few superficial papules on the forehead shoulders and upper back.  Scattered closed comedones on the forehead.  - No other lesions of concern on areas examined.     Medications:  Current Outpatient Medications   Medication     tretinoin (RETIN-A) 0.025 % external cream     clindamycin-benzoyl peroxide (BENZACLIN) gel     diltiazem 2% in PLO cream, FV COMPOUNDED, 2% GEL     escitalopram (LEXAPRO) 10 MG tablet     norethindrone-ethinyl estradiol (BLISOVI FE 1/20) 1-20 MG-MCG per tablet     No current facility-administered medications for this visit.       Past Medical/Surgical History:   Patient Active Problem List   Diagnosis     Adolescent idiopathic scoliosis of thoracolumbar region     Right shoulder pain     Past Medical History:   Diagnosis Date     Adjustment disorder with mixed anxiety and depressed mood      Dysmenorrhea      Panic attacks        CC Referred Self, MD  No address on file on close of this encounter.

## 2021-08-24 ENCOUNTER — TRANSCRIBE ORDERS (OUTPATIENT)
Dept: OTHER | Age: 21
End: 2021-08-24

## 2021-08-24 DIAGNOSIS — G43.009 MIGRAINE WITHOUT AURA AND WITHOUT STATUS MIGRAINOSUS, NOT INTRACTABLE: Primary | ICD-10-CM

## 2021-09-08 ENCOUNTER — TELEPHONE (OUTPATIENT)
Dept: NEUROLOGY | Facility: CLINIC | Age: 21
End: 2021-09-08

## 2021-09-08 ENCOUNTER — HOSPITAL ENCOUNTER (EMERGENCY)
Facility: CLINIC | Age: 21
Discharge: HOME | End: 2021-09-08
Payer: COMMERCIAL

## 2021-09-08 VITALS
BODY MASS INDEX: 25.51 KG/M2 | OXYGEN SATURATION: 100 % | SYSTOLIC BLOOD PRESSURE: 129 MMHG | DIASTOLIC BLOOD PRESSURE: 83 MMHG | RESPIRATION RATE: 16 BRPM | TEMPERATURE: 98.1 F | WEIGHT: 155 LBS | HEART RATE: 68 BPM

## 2021-09-08 NOTE — ED TRIAGE NOTES
Numbness to right face since Sunday, lasted on day and resolved Sunday night  Resumed yesterday afternoon, slept well last night, today has numbness to right face and right hand  Suffering migraines and vision problems this summer

## 2021-09-08 NOTE — ED PROVIDER NOTES
"ED Provider Note  [facility]      History   No chief complaint on file.    HPI  Korin Darby is a 21 year old female with a PMH of idiopathic scoliosis, migraine and anxiety who presents to the ED today complaining of a migraine and right-sided facial tingling. ***    Past Medical History  Past Medical History:   Diagnosis Date     Adjustment disorder with mixed anxiety and depressed mood      Dysmenorrhea      Panic attacks      No past surgical history on file.  spironolactone (ALDACTONE) 50 MG tablet  tretinoin (RETIN-A) 0.025 % external cream  tretinoin (RETIN-A) 0.05 % external cream      Allergies   Allergen Reactions     Amoxicillin Hives     Family History  Family History   Problem Relation Age of Onset     Anxiety Disorder Mother      Bipolar Disorder Father      Ulcerative Colitis No family hx of      Crohn's Disease No family hx of      Celiac Disease No family hx of      Social History   Social History     Tobacco Use     Smoking status: Never Smoker     Smokeless tobacco: Never Used   Substance Use Topics     Alcohol use: No     Drug use: No      Past medical history, past surgical history, medications, allergies, family history, and social history were reviewed with the patient. No additional pertinent items.       Review of Systems  {Complete vs limited ROS:586339::\"A complete review of systems was performed with pertinent positives and negatives noted in the HPI, and all other systems negative.\"}    Physical Exam      Physical Exam  ***  ED Course      Procedures       {ED Course Selections:447648}       No results found for any visits on 09/08/21.  Medications - No data to display     Assessments & Plan (with Medical Decision Making)   ***    I have reviewed the nursing notes. I have reviewed the findings, diagnosis, plan and need for follow up with the patient.    New Prescriptions    No medications on file       Final diagnoses:   None       --  ***   HEALTH " [facility] EMERGENCY DEPARTMENT  9/8/2021

## 2021-09-08 NOTE — TELEPHONE ENCOUNTER
[facility] Call Center    Phone Message    May a detailed message be left on voicemail: yes     Reason for Call: Symptoms or Concerns     If patient has red-flag symptoms, warm transfer to triage line    Current symptom or concern: Headache, Numbness, Tingling, on the R side of her face  And dizziness      Symptoms have been present for:  3 day(s)    Has patient previously been seen for this? No      Are there any new or worsening symptoms? Yes: dizziness and disoriented       Action Taken: Message routed to:  [facility] neurology    Travel Screening: Not Applicable

## 2021-09-08 NOTE — TELEPHONE ENCOUNTER
"I called pt back to discuss her symptoms. She has had progressively worsening migraines/ vision issues over the summer and is scheduled to see Dr. Simon for the first time in October.     Starting Sunday she has had numbness and \"weird feeling\" to her right face. I asked about other numbness or tingling; she said some in her right hand as well. Yesterday she also had episode of dizziness/disorientation. She has never had numbness before and never had dizziness this extensive.     Due to new symptoms I recommended urgent evaluation in ER to rule out stroke/TIA. Pt is near the [facility] ER and I told her to go there as soon as she can. She agreed to plan and will report to ER for evaluation.     Bijal BALLESTEROS  "

## 2021-09-27 ENCOUNTER — OFFICE VISIT (OUTPATIENT)
Dept: OBGYN | Facility: CLINIC | Age: 21
End: 2021-09-27
Payer: COMMERCIAL

## 2021-09-27 VITALS
WEIGHT: 144 LBS | HEIGHT: 65 IN | BODY MASS INDEX: 23.99 KG/M2 | TEMPERATURE: 98.4 F | SYSTOLIC BLOOD PRESSURE: 100 MMHG | HEART RATE: 57 BPM | DIASTOLIC BLOOD PRESSURE: 62 MMHG

## 2021-09-27 DIAGNOSIS — Z30.430 ENCOUNTER FOR INSERTION OF INTRAUTERINE CONTRACEPTIVE DEVICE: ICD-10-CM

## 2021-09-27 DIAGNOSIS — Z23 NEED FOR PROPHYLACTIC VACCINATION AND INOCULATION AGAINST INFLUENZA: Primary | ICD-10-CM

## 2021-09-27 LAB
HCG UR QL: NEGATIVE
INTERNAL QC OK POCT: NORMAL

## 2021-09-27 PROCEDURE — 90686 IIV4 VACC NO PRSV 0.5 ML IM: CPT | Performed by: OBSTETRICS & GYNECOLOGY

## 2021-09-27 PROCEDURE — 90471 IMMUNIZATION ADMIN: CPT | Performed by: OBSTETRICS & GYNECOLOGY

## 2021-09-27 PROCEDURE — 99203 OFFICE O/P NEW LOW 30 MIN: CPT | Mod: 25 | Performed by: OBSTETRICS & GYNECOLOGY

## 2021-09-27 PROCEDURE — 58300 INSERT INTRAUTERINE DEVICE: CPT | Performed by: OBSTETRICS & GYNECOLOGY

## 2021-09-27 PROCEDURE — 81025 URINE PREGNANCY TEST: CPT | Performed by: OBSTETRICS & GYNECOLOGY

## 2021-09-27 RX ORDER — COPPER 313.4 MG/1
1 INTRAUTERINE DEVICE INTRAUTERINE ONCE
COMMUNITY
End: 2021-09-27

## 2021-09-27 RX ORDER — COPPER 313.4 MG/1
1 INTRAUTERINE DEVICE INTRAUTERINE ONCE
COMMUNITY

## 2021-09-27 RX ORDER — COPPER 313.4 MG/1
1 INTRAUTERINE DEVICE INTRAUTERINE ONCE
Status: COMPLETED
Start: 2021-09-27 | End: 2021-09-27

## 2021-09-27 RX ADMIN — COPPER 1 EACH: 313.4 INTRAUTERINE DEVICE INTRAUTERINE at 10:13

## 2021-09-27 ASSESSMENT — ANXIETY QUESTIONNAIRES
3. WORRYING TOO MUCH ABOUT DIFFERENT THINGS: SEVERAL DAYS
1. FEELING NERVOUS, ANXIOUS, OR ON EDGE: MORE THAN HALF THE DAYS
IF YOU CHECKED OFF ANY PROBLEMS ON THIS QUESTIONNAIRE, HOW DIFFICULT HAVE THESE PROBLEMS MADE IT FOR YOU TO DO YOUR WORK, TAKE CARE OF THINGS AT HOME, OR GET ALONG WITH OTHER PEOPLE: SOMEWHAT DIFFICULT
2. NOT BEING ABLE TO STOP OR CONTROL WORRYING: SEVERAL DAYS
6. BECOMING EASILY ANNOYED OR IRRITABLE: SEVERAL DAYS
GAD7 TOTAL SCORE: 6
7. FEELING AFRAID AS IF SOMETHING AWFUL MIGHT HAPPEN: NOT AT ALL
5. BEING SO RESTLESS THAT IT IS HARD TO SIT STILL: NOT AT ALL

## 2021-09-27 ASSESSMENT — PATIENT HEALTH QUESTIONNAIRE - PHQ9
SUM OF ALL RESPONSES TO PHQ QUESTIONS 1-9: 5
5. POOR APPETITE OR OVEREATING: SEVERAL DAYS

## 2021-09-27 ASSESSMENT — MIFFLIN-ST. JEOR: SCORE: 1419.61

## 2021-09-27 NOTE — PROGRESS NOTES
[facility]  OB/GYN Clinic   Gynecology Consult Note    CC:    Chief Complaint   Patient presents with     Consult     IUD consult     Imm/Inj     Flu Shot       HPI: Ms. Darby is a 21 year old G0 who presents for contraception counseling.     GYN Hx: Reg periods, lasting 5-6 days. Changes diva cup 1-2x a day. No clots or significant dysmenorrhea. No STI or pregnancy hx. Has used OCPs, but noticed her mood was worse on them. Using condoms, every time, no breakage issues.     ROS: A 10 pt ROS was completed and found to be otherwise negative unless mentioned in the HPI.     PMH:   Past Medical History:   Diagnosis Date     Adjustment disorder with mixed anxiety and depressed mood      Dysmenorrhea      Panic attacks        PSHx:   No past surgical history on file.    OBHx:   OB History   No obstetric history on file.       Medications:   tretinoin (RETIN-A) 0.05 % external cream, Apply topically At Bedtime    No current facility-administered medications on file prior to visit.      Allergies:      Allergies   Allergen Reactions     Amoxicillin Hives       Social History:   Social History     Socioeconomic History     Marital status: Single     Spouse name: Not on file     Number of children: Not on file     Years of education: Not on file     Highest education level: Not on file   Occupational History     Not on file   Tobacco Use     Smoking status: Never Smoker     Smokeless tobacco: Never Used   Substance and Sexual Activity     Alcohol use: No     Drug use: No     Sexual activity: Not on file     Comment: Denies anal intercourse    Other Topics Concern     Not on file   Social History Narrative     Not on file     Social Determinants of Health     Financial Resource Strain:      Difficulty of Paying Living Expenses:    Food Insecurity:      Worried About Running Out of Food in the Last Year:      Ran Out of Food in the Last Year:    Transportation Needs:      Lack of Transportation (Medical):       Lack of Transportation (Non-Medical):    Physical Activity:      Days of Exercise per Week:      Minutes of Exercise per Session:    Stress:      Feeling of Stress :    Social Connections:      Frequency of Communication with Friends and Family:      Frequency of Social Gatherings with Friends and Family:      Attends Jew Services:      Active Member of Clubs or Organizations:      Attends Club or Organization Meetings:      Marital Status:    Intimate Partner Violence:      Fear of Current or Ex-Partner:      Emotionally Abused:      Physically Abused:      Sexually Abused:      Social History     Socioeconomic History     Marital status: Single     Spouse name: Not on file     Number of children: Not on file     Years of education: Not on file     Highest education level: Not on file   Occupational History     Not on file   Tobacco Use     Smoking status: Never Smoker     Smokeless tobacco: Never Used   Substance and Sexual Activity     Alcohol use: No     Drug use: No     Sexual activity: Not on file     Comment: Denies anal intercourse    Other Topics Concern     Not on file   Social History Narrative     Not on file     Social Determinants of Health     Financial Resource Strain:      Difficulty of Paying Living Expenses:    Food Insecurity:      Worried About Running Out of Food in the Last Year:      Ran Out of Food in the Last Year:    Transportation Needs:      Lack of Transportation (Medical):      Lack of Transportation (Non-Medical):    Physical Activity:      Days of Exercise per Week:      Minutes of Exercise per Session:    Stress:      Feeling of Stress :    Social Connections:      Frequency of Communication with Friends and Family:      Frequency of Social Gatherings with Friends and Family:      Attends Jew Services:      Active Member of Clubs or Organizations:      Attends Club or Organization Meetings:      Marital Status:    Intimate Partner Violence:      Fear of Current or  "Ex-Partner:      Emotionally Abused:      Physically Abused:      Sexually Abused:        Family History:   Family History   Problem Relation Age of Onset     Anxiety Disorder Mother      Bipolar Disorder Father      Ulcerative Colitis No family hx of      Crohn's Disease No family hx of      Celiac Disease No family hx of        Physical Exam:   Vitals:    09/27/21 0908   BP: 100/62   BP Location: Right arm   Patient Position: Sitting   Cuff Size: Adult Regular   Pulse: 57   Temp: 98.4  F (36.9  C)   TempSrc: Tympanic   Weight: 65.3 kg (144 lb)   Height: 1.66 m (5' 5.35\")      Estimated body mass index is 23.71 kg/m  as calculated from the following:    Height as of this encounter: 1.66 m (5' 5.35\").    Weight as of this encounter: 65.3 kg (144 lb).    Gen: Pleasant, talkative female in no apparent distress   Respiratory: breathing comfortably on room air   Cardiac: Regular rate, warm and well-perfused.   GI: Abd soft and non-tender  : External genitalia is free of lesion. Urethra and bartholin glands normal.  Vaginal mucosa is moist and pink without unusual discharge.  Cervix is without lesions or discharge. Bimanual exam reveals mobile anteverted uterus without cervical motion tenderness.  Adenexa are mobile and non-tender bilaterally.  Rectal: no masses or hemorrhoids visually appreciated  Derm: no acanthosis nigricans, ance or facial/back/abdominal hair growth patterns   MSK: Grossly normal movement of all four extremities  Psych: mood and affect bright       A&P: 21 year old yo G0 who presents for contraception counseling. Discussed highly effective methods first, namely LARCs, including the IUD (Mirena, Kyleena, Yolette and Paraguard) and the Nexplanon. Discussed placement, expected bleeding profiles, side effect profile, efficacy (as high as sterilization) and reversibility. Discussed protection from endometrial cancer and hyperplasia with progesterone-containing IUD. Discussed the remaining options of " reduced efficacy including depo provera, OCPs, hormonal patch and hormonal ring including side effect profile and bleeding patterns.   After this discussion, the patient would like to proceed with Paraguard IUD. We reivewed expected bleeding profiles of this in detail and she is prepared.     IUD Placement Procedure Note    Korin Darby  2000  [id number]    The patient was counseled on the risks (including risk of infection, uterine perforation, cramping, bleeding), benefits (high efficacy, low maintenance birth control), and alternatives of the procedure. Verbal and written consent were obtained.  A UPT was negative prior to the procedure.    Technique: The patient was placed in the dorsal lithotomy position.  A speculum was placed in the vagina and the cervix was cleaned with betadine swabsx3. The anterior cervical lip was grasped with a tenaculum. The Paraguard IUD was loaded, advanced to the fundus, deployed and insertor removed. IUD strings were cut 3cm from the external cervical os. The patient tolerated the procedure well and there were no complications. EBL: 0cc.     Rosamaria Grajeda MD  OB/GYN  9/27/2021

## 2021-09-27 NOTE — Clinical Note
Please abstract the following data from this visit with this patient into the appropriate field in Epic:    Tests that can be patient reported without a hard copy:    Chlamydia testing done on this date: 2021 [facility]    Other Tests found in the patient's chart through Chart Review/Care Everywhere:    {Abstract Quality List (Optional):987702}    Note to Abstraction: If this section is blank, no results were found via Chart Review/Care Everywhere.

## 2021-09-27 NOTE — Clinical Note
Please abstract the following data from this visit with this patient into the appropriate field in Epic:    Tests that can be patient reported without a hard copy:    Pap smear done on this date: 2021 (approximately), by this group: [facility], results were NILML.     Other Tests found in the patient's chart through Chart Review/Care Everywhere:    {Abstract Quality List (Optional):158390}    Note to Abstraction: If this section is blank, no results were found via Chart Review/Care Everywhere.

## 2021-09-28 ASSESSMENT — ANXIETY QUESTIONNAIRES: GAD7 TOTAL SCORE: 6

## 2021-09-30 NOTE — TELEPHONE ENCOUNTER
FUTURE VISIT INFORMATION      FUTURE VISIT INFORMATION:    Date: 10/20/2021    Time: 8am    [place] [facility] [place]  REFERRAL INFORMATION:    Referring provider:  Dr. Madeleine Rodriguez     Referring providers clinic:  [facility]     Reason for visit/diagnosis  Migraines     RECORDS REQUESTED FROM:       Clinic name Comments Records Status Imaging Status   Internal Dr. Madeleine Rodriguez-8/23/2021 Care Everywhere N/A

## 2021-10-20 ENCOUNTER — TELEPHONE (OUTPATIENT)
Dept: NEUROLOGY | Facility: CLINIC | Age: 21
End: 2021-10-20

## 2021-10-20 ENCOUNTER — PRE VISIT (OUTPATIENT)
Dept: NEUROLOGY | Facility: CLINIC | Age: 21
End: 2021-10-20

## 2021-10-20 ENCOUNTER — VIRTUAL VISIT (OUTPATIENT)
Dept: NEUROLOGY | Facility: CLINIC | Age: 21
End: 2021-10-20
Attending: FAMILY MEDICINE
Payer: COMMERCIAL

## 2021-10-20 DIAGNOSIS — G43.109 MIGRAINE WITH AURA AND WITHOUT STATUS MIGRAINOSUS, NOT INTRACTABLE: Primary | ICD-10-CM

## 2021-10-20 PROCEDURE — 99204 OFFICE O/P NEW MOD 45 MIN: CPT | Mod: 95 | Performed by: PSYCHIATRY & NEUROLOGY

## 2021-10-20 RX ORDER — RIZATRIPTAN BENZOATE 10 MG/1
10 TABLET ORAL
Qty: 18 TABLET | Refills: 3 | Status: SHIPPED | OUTPATIENT
Start: 2021-10-20

## 2021-10-20 ASSESSMENT — HEADACHE IMPACT TEST (HIT 6)
HOW OFTEN DID HEADACHS LIMIT CONCENTRATION ON WORK OR DAILY ACTIVITY: VERY OFTEN
HOW OFTEN DO HEADACHES LIMIT YOUR DAILY ACTIVITIES: SOMETIMES
HIT6 TOTAL SCORE: 65
HOW OFTEN HAVE YOU FELT FED UP OR IRRITATED BECAUSE OF YOUR HEADACHES: SOMETIMES
WHEN YOU HAVE HEADACHES HOW OFTEN IS THE PAIN SEVERE: VERY OFTEN
HOW OFTEN HAVE YOU FELT TOO TIRED TO WORK BECAUSE OF YOUR HEADACHES: SOMETIMES
WHEN YOU HAVE A HEADACHE HOW OFTEN DO YOU WISH YOU COULD LIE DOWN: ALWAYS

## 2021-10-20 NOTE — LETTER
10/20/2021       RE: Korin Darby  [address] [facility] [address]     Dear Colleague,    Thank you for referring your patient, Korin Darby, to the [facility] NEUROLOG[facility] [place] [facility]. Please see a copy of my visit note below.    Last Patient-Answered HIT-6 Questionnaire  HIT-6 10/20/2021   When you have headaches, how often is the pain severe 11   How often do headaches limit your ability to do usual daily activities including household work, work, school, or social activities? 10   When you have a headache, how often do you wish you could lie down? 13   In the past 4 weeks, how often have you felt too tired to do work or daily activities because of your headaches 10   In the past 4 weeks, how often have you felt fed up or irritated because of your headaches 10   In the past 4 weeks, how often did headaches limit your ability to concentrate on work or daily activities 11   HIT-6 Total Score 65     MIGRAINE DISABILITY ASSESSMENT (MIDAS)    On how many days in the last 3 months did you miss work or school because of your headaches?  6    How many days in the last 3 months was your productivity at work or school reduced by half or more because of your headaches? (Do not include days you counted in question 1 where you missed work or school.)  15    On how many days in the last 3 months did you not do household work (such as housework, home repairs and maintenance, shopping, caring for children and relatives) because of your headaches?  20    How many days in the last 3 months was your productivity in household work reduced by half or more because of your headaches? (Do not include days you counted in question 3 where you did not do household work).  17    On how many days in the last 3 months did you miss family, social, or lesiure activities because of your headaches?  0    MIDAS Total Score:     On how many days in the last 3  months did you have a headache? (If a headache lasted more than 1 day, count each day.)   24    On a scale of 0 - 10, on average how painful were these headaches (where 0 = no pain at all, and 10 = pain as bad as it can be.)  4    [facility]  Virtual Neurology Consult     Korin Darby MRN# [id number]   YOB: 2000 Age: 21 year old     Requesting physician: Jennifer Rodriguez MD         Assessment and Recommendations:     Korin Darby is a 21 year old female  who presents for further evaluation of new headaches.     Her headache presentation meets criteria for diagnosis of: Episodic Migraine with visual Aura      I suspect she may have this on a genetic basis, as she describe her mother also has symptoms of migraine and headache.    I did not appreciate any red flags that would warrant further evaluation for secondary causes of headache today and reassured as her virtual neurologic examination is intact.      Going forward, we reviewed a symptomatic treatment strategies, focusing on optimizing a acute treatment.  -For acute treatment of headache, discussed efficacy and side effects and recommended trial with rizatriptan 10 mg, to be used at the onset of headache, with a repeat dose in 2 hours if needed, not to exceed more than 9 days/month to avoid medication overuse.  -Additionally discussed she can continue to use Acetaminophen in addition to the new rizatriptan, if needed, but not to exceed more than 14 days/month to avoid medication overuse.    Her headache frequency and severity warrants prevention and we briefly discussed a variety of options, but Ms. Espana was not interested in a preventative at this time.   -In the future amitriptyline or topiramate may be considered,  or a CGRP monoclonal antibody, would avoid beta blockers such as propranolol in the setting of possible untreated depression, bradycardia and  "description of symptomatic orthostasis, hypotension and prior syncope. Additionally retain a low threshold for opthalmologic assesement should her headches worsen change in character, or fail to improve.     I will plan to see her back in ~3 months    The patient was discussed  Dr. Simon, who agrees with my assessment and plan     Lois Olguin DO, DIDIER  PGY-2 Neurology Resident    Physician Attestation   I, Candelaria Simon MD, saw this patient and agree with the findings and plan of care as documented in the note.      Candelaria Simon MD  Neurology            Chief Complaint:     Chief Complaint   Patient presents with     Neurology Video Visit NEW           History is obtained from the patient and medical record.  Patient was seen via a virtual visit in their home due to the Covid 19 global pandemic.      Korin Darby is a 21 year old female who has been living with headaches since last Spring 2021. She describes headaches and migraine attacks increasing in frequency to 10/30 days per month. She also notes new tunnel vision or blurred vision. She also has black spots in her vision with migraine attacks.    Migraine attacks feel like a pressing, pressure, sharp pain in her temples or up into her eyes bilaterally. They are severe and last a day, 24 hours. They occur 2-3 times per month currently.     She has associated photophobia, phonophobia, nausea, no vomiting.    She gets black dots in her vision at the onset of an attack. She will also feel dizzy and feel \"weird\". She also has right sided tingling at onset. She denies other neurologic symptoms, autonomic features. They are not exertional.    Triggers include bright lights, loud sounds, dehydration.     Treatment include Tylenol (not always helpful), laying in bed.    In addition, she also has \"headaches\" once per week. They are bifrontal or unilateral, and over her eyes. They are moderately severe. This is a similar pain to migraine pain. These " occur 1-2 times per week.    She previously took Lexapro and another anxiety medication.    She also has brief episodes of lightheadedness.            Past Medical History:     Past Medical History:   Diagnosis Date     Adjustment disorder with mixed anxiety and depressed mood      Dysmenorrhea      Panic attacks               Past Surgical History:   No past surgical history on file.          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 She is a  and also works at an [employer] school. She misses school or work twice per month due to headaches.    She drinks one glass of wine per week. She denies smoking or drug use.     She drinks coffee 1-2 cups per day.          Family History:   There is a history of headaches (and one migraine attack) in her mother.     Family History   Problem Relation Age of Onset     Anxiety Disorder Mother      Bipolar Disorder Father      Ulcerative Colitis No family hx of      Crohn's Disease No family hx of      Celiac Disease No family hx of              Allergies:      Allergies   Allergen Reactions     Amoxicillin Hives             Medications:     Current Outpatient Medications:      paragard intrauterine copper device, 1 each by Intrauterine route once, Disp: , Rfl:      tretinoin (RETIN-A) 0.05 % external cream, Apply topically At Bedtime, Disp: 45 g, Rfl: 3          Physical Exam:   There were no vitals taken for this visit.   Physical Exam:   General: NAD  Neurologic:   Mental Status Exam: Alert, awake and oriented to situation. No dysarthria. Speech of normal fluency.   Cranial Nerves: Pupils equal, EOMs intact, no nystagmus, facial movements symmetric, hearing intact to conversation, tongue midline and fully mobile. No tongue atrophy or fasciculations.    Motor: No drift in upper extremities. Able to stand from a seated position without use of arms. No tremors or abnormal movements noted.   Coordination: With arms outstretched, able to  touch nose using index finger accurately bilaterally.  Normal finger tapping bilaterally.     Gait: Normal stance and casual gait.    Neck: Normal range of motion with lateral head movements and neck flexion.  Eyes: No conjunctival injection, no scleral icterus.           Data:     No previous head imaging.        Again, thank you for allowing me to participate in the care of your patient.      Sincerely,    Candelaria Simon MD

## 2021-10-20 NOTE — PROGRESS NOTES
Korin is a 21 year old who is being evaluated via a billable video visit.      How would you like to obtain your AVS? Humbertohart  If the video visit is dropped, the invitation should be resent by: Text to cell phone: [phone]  Will anyone else be joining your video visit? No      Video Start Time: 8:07am  Video-Visit Details    Type of service:  Video Visit    Video End Time:8:38am    Originating Location (pt. Location): Home    Distant Location (provider location):  [facility] NEUROLOGY [facility]     Platform used for Video Visit: Luis     Last Patient-Answered HIT-6 Questionnaire  HIT-6 10/20/2021   When you have headaches, how often is the pain severe 11   How often do headaches limit your ability to do usual daily activities including household work, work, school, or social activities? 10   When you have a headache, how often do you wish you could lie down? 13   In the past 4 weeks, how often have you felt too tired to do work or daily activities because of your headaches 10   In the past 4 weeks, how often have you felt fed up or irritated because of your headaches 10   In the past 4 weeks, how often did headaches limit your ability to concentrate on work or daily activities 11   HIT-6 Total Score 65     MIGRAINE DISABILITY ASSESSMENT (MIDAS)    On how many days in the last 3 months did you miss work or school because of your headaches?  6    How many days in the last 3 months was your productivity at work or school reduced by half or more because of your headaches? (Do not include days you counted in question 1 where you missed work or school.)  15    On how many days in the last 3 months did you not do household work (such as housework, home repairs and maintenance, shopping, caring for children and relatives) because of your headaches?  20    How many days in the last 3 months was your productivity in household work reduced by half or more because of your headaches? (Do not include days  you counted in question 3 where you did not do household work).  17    On how many days in the last 3 months did you miss family, social, or lesiure activities because of your headaches?  0    MIDAS Total Score:     On how many days in the last 3 months did you have a headache? (If a headache lasted more than 1 day, count each day.)   24    On a scale of 0 - 10, on average how painful were these headaches (where 0 = no pain at all, and 10 = pain as bad as it can be.)  4    [facility]  Virtual Neurology Consult     Korin Darby MRN# [id number]   YOB: 2000 Age: 21 year old     Requesting physician: Jennifer Rodriguez MD         Assessment and Recommendations:     Korin Darby is a 21 year old female  who presents for further evaluation of new headaches.     Her headache presentation meets criteria for diagnosis of: Episodic Migraine with visual Aura      I suspect she may have this on a genetic basis, as she describe her mother also has symptoms of migraine and headache.    I did not appreciate any red flags that would warrant further evaluation for secondary causes of headache today and reassured as her virtual neurologic examination is intact.      Going forward, we reviewed a symptomatic treatment strategies, focusing on optimizing a acute treatment.  -For acute treatment of headache, discussed efficacy and side effects and recommended trial with rizatriptan 10 mg, to be used at the onset of headache, with a repeat dose in 2 hours if needed, not to exceed more than 9 days/month to avoid medication overuse.  -Additionally discussed she can continue to use Acetaminophen in addition to the new rizatriptan, if needed, but not to exceed more than 14 days/month to avoid medication overuse.    Her headache frequency and severity warrants prevention and we briefly discussed a variety of options, but Ms. Espana was not  "interested in a preventative at this time.   -In the future amitriptyline or topiramate may be considered,  or a CGRP monoclonal antibody, would avoid beta blockers such as propranolol in the setting of possible untreated depression, bradycardia and description of symptomatic orthostasis, hypotension and prior syncope. Additionally retain a low threshold for opthalmologic assesement should her headches worsen change in character, or fail to improve.     I will plan to see her back in ~3 months    The patient was discussed  Dr. Simon, who agrees with my assessment and plan     Lois Olguin DO, DIDIER  PGY-2 Neurology Resident    Physician Attestation   I, Candelaria Simon MD, saw this patient and agree with the findings and plan of care as documented in the note.      Candelaria Simon MD  Neurology            Chief Complaint:     Chief Complaint   Patient presents with     Neurology Video Visit NEW           History is obtained from the patient and medical record.  Patient was seen via a virtual visit in their home due to the Covid 19 global pandemic.      Korin Darby is a 21 year old female who has been living with headaches since last Spring 2021. She describes headaches and migraine attacks increasing in frequency to 10/30 days per month. She also notes new tunnel vision or blurred vision. She also has black spots in her vision with migraine attacks.    Migraine attacks feel like a pressing, pressure, sharp pain in her temples or up into her eyes bilaterally. They are severe and last a day, 24 hours. They occur 2-3 times per month currently.     She has associated photophobia, phonophobia, nausea, no vomiting.    She gets black dots in her vision at the onset of an attack. She will also feel dizzy and feel \"weird\". She also has right sided tingling at onset. She denies other neurologic symptoms, autonomic features. They are not exertional.    Triggers include bright lights, loud sounds, dehydration. " "    Treatment include Tylenol (not always helpful), laying in bed.    In addition, she also has \"headaches\" once per week. They are bifrontal or unilateral, and over her eyes. They are moderately severe. This is a similar pain to migraine pain. These occur 1-2 times per week.    She previously took Lexapro and another anxiety medication.    She also has brief episodes of lightheadedness.            Past Medical History:     Past Medical History:   Diagnosis Date     Adjustment disorder with mixed anxiety and depressed mood      Dysmenorrhea      Panic attacks               Past Surgical History:   No past surgical history on file.          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 She is a  and also works at an [employer] school. She misses school or work twice per month due to headaches.    She drinks one glass of wine per week. She denies smoking or drug use.     She drinks coffee 1-2 cups per day.          Family History:   There is a history of headaches (and one migraine attack) in her mother.     Family History   Problem Relation Age of Onset     Anxiety Disorder Mother      Bipolar Disorder Father      Ulcerative Colitis No family hx of      Crohn's Disease No family hx of      Celiac Disease No family hx of              Allergies:      Allergies   Allergen Reactions     Amoxicillin Hives             Medications:     Current Outpatient Medications:      paragard intrauterine copper device, 1 each by Intrauterine route once, Disp: , Rfl:      tretinoin (RETIN-A) 0.05 % external cream, Apply topically At Bedtime, Disp: 45 g, Rfl: 3          Physical Exam:   There were no vitals taken for this visit.   Physical Exam:   General: NAD  Neurologic:   Mental Status Exam: Alert, awake and oriented to situation. No dysarthria. Speech of normal fluency.   Cranial Nerves: Pupils equal, EOMs intact, no nystagmus, facial movements symmetric, hearing intact to conversation, tongue " midline and fully mobile. No tongue atrophy or fasciculations.    Motor: No drift in upper extremities. Able to stand from a seated position without use of arms. No tremors or abnormal movements noted.   Coordination: With arms outstretched, able to touch nose using index finger accurately bilaterally.  Normal finger tapping bilaterally.     Gait: Normal stance and casual gait.    Neck: Normal range of motion with lateral head movements and neck flexion.  Eyes: No conjunctival injection, no scleral icterus.           Data:     No previous head imaging.

## 2021-10-20 NOTE — TELEPHONE ENCOUNTER
Called patient to complete check out process per Return in about 3 months (around 1/20/2022). appt with [facility]. Unable to reach patient, left voicemail to return call to clinic @ [phone] to schedule.     Diana Escamilla, Virtual Facilitator

## 2022-01-23 ENCOUNTER — HEALTH MAINTENANCE LETTER (OUTPATIENT)
Age: 22
End: 2022-01-23

## 2022-09-10 ENCOUNTER — HEALTH MAINTENANCE LETTER (OUTPATIENT)
Age: 22
End: 2022-09-10

## 2023-01-21 ENCOUNTER — HEALTH MAINTENANCE LETTER (OUTPATIENT)
Age: 23
End: 2023-01-21

## 2024-02-18 ENCOUNTER — HEALTH MAINTENANCE LETTER (OUTPATIENT)
Age: 24
End: 2024-02-18

## 2024-03-03 ENCOUNTER — MEDICAL CORRESPONDENCE (OUTPATIENT)
Dept: HEALTH INFORMATION MANAGEMENT | Facility: CLINIC | Age: 24
End: 2024-03-03
Payer: COMMERCIAL

## 2024-03-05 ENCOUNTER — TRANSCRIBE ORDERS (OUTPATIENT)
Dept: OTHER | Age: 24
End: 2024-03-05

## 2024-03-05 DIAGNOSIS — R10.30 LOWER ABDOMINAL PAIN: Primary | ICD-10-CM

## 2024-09-15 ENCOUNTER — HEALTH MAINTENANCE LETTER (OUTPATIENT)
Age: 24
End: 2024-09-15